# Patient Record
Sex: FEMALE | Race: BLACK OR AFRICAN AMERICAN | URBAN - METROPOLITAN AREA
[De-identification: names, ages, dates, MRNs, and addresses within clinical notes are randomized per-mention and may not be internally consistent; named-entity substitution may affect disease eponyms.]

---

## 2020-06-18 ENCOUNTER — OFFICE VISIT (OUTPATIENT)
Dept: URBAN - METROPOLITAN AREA CLINIC 29 | Facility: CLINIC | Age: 70
End: 2020-06-18

## 2020-06-18 PROBLEM — 3696007 FUNCTIONAL DYSPEPSIA: Status: ACTIVE | Noted: 2020-06-18

## 2020-06-18 PROBLEM — 14760008 CONSTIPATION: Status: ACTIVE | Noted: 2020-06-18

## 2020-07-16 ENCOUNTER — OFFICE VISIT (OUTPATIENT)
Dept: URBAN - METROPOLITAN AREA CLINIC 29 | Facility: CLINIC | Age: 70
End: 2020-07-16

## 2020-08-11 ENCOUNTER — OFFICE VISIT (OUTPATIENT)
Dept: URBAN - METROPOLITAN AREA CLINIC 29 | Facility: CLINIC | Age: 70
End: 2020-08-11

## 2021-01-05 ENCOUNTER — OFFICE VISIT (OUTPATIENT)
Dept: URBAN - METROPOLITAN AREA CLINIC 29 | Facility: CLINIC | Age: 71
End: 2021-01-05

## 2022-04-30 ENCOUNTER — TELEPHONE ENCOUNTER (OUTPATIENT)
Dept: URBAN - METROPOLITAN AREA CLINIC 121 | Facility: CLINIC | Age: 72
End: 2022-04-30

## 2022-04-30 RX ORDER — PANTOPRAZOLE SODIUM 40 MG/1
TAKE 1 TABLET PO BID TABLET, DELAYED RELEASE ORAL
OUTPATIENT
Start: 2020-06-18

## 2022-04-30 RX ORDER — SUCRALFATE 1 G/10ML
TAKE 1 GM PO TID X 2 WEEKS SUSPENSION ORAL
OUTPATIENT
Start: 2020-06-18 | End: 2020-07-02

## 2022-05-01 ENCOUNTER — TELEPHONE ENCOUNTER (OUTPATIENT)
Dept: URBAN - METROPOLITAN AREA CLINIC 121 | Facility: CLINIC | Age: 72
End: 2022-05-01

## 2022-05-01 RX ORDER — PANTOPRAZOLE SODIUM 40 MG/1
TAKE 1 TABLET BY MOUTH TWICE A DAY TABLET, DELAYED RELEASE ORAL
Status: ACTIVE | COMMUNITY
Start: 2021-02-12

## 2022-05-01 RX ORDER — FAMOTIDINE 40 MG/1
TABLET, FILM COATED ORAL
Status: ACTIVE | COMMUNITY
Start: 2020-06-17

## 2022-05-01 RX ORDER — LACTASE 3000 UNIT
TABLET ORAL
Status: ACTIVE | COMMUNITY
Start: 2020-06-17

## 2022-06-14 ENCOUNTER — WEB ENCOUNTER (OUTPATIENT)
Dept: URBAN - METROPOLITAN AREA CLINIC 92 | Facility: CLINIC | Age: 72
End: 2022-06-14

## 2022-06-14 ENCOUNTER — TELEPHONE ENCOUNTER (OUTPATIENT)
Dept: URBAN - METROPOLITAN AREA CLINIC 84 | Facility: CLINIC | Age: 72
End: 2022-06-14

## 2022-06-14 ENCOUNTER — CLAIMS CREATED FROM THE CLAIM WINDOW (OUTPATIENT)
Dept: URBAN - METROPOLITAN AREA CLINIC 92 | Facility: CLINIC | Age: 72
End: 2022-06-14

## 2022-06-14 ENCOUNTER — CLAIMS CREATED FROM THE CLAIM WINDOW (OUTPATIENT)
Dept: URBAN - METROPOLITAN AREA CLINIC 92 | Facility: CLINIC | Age: 72
End: 2022-06-14
Payer: MEDICARE

## 2022-06-14 ENCOUNTER — OFFICE VISIT (OUTPATIENT)
Dept: URBAN - METROPOLITAN AREA CLINIC 92 | Facility: CLINIC | Age: 72
End: 2022-06-14

## 2022-06-14 VITALS
HEART RATE: 61 BPM | BODY MASS INDEX: 33.99 KG/M2 | HEIGHT: 61 IN | WEIGHT: 180 LBS | TEMPERATURE: 98 F | DIASTOLIC BLOOD PRESSURE: 98 MMHG | SYSTOLIC BLOOD PRESSURE: 152 MMHG

## 2022-06-14 DIAGNOSIS — D12.6 TUBULAR ADENOMA OF COLON: ICD-10-CM

## 2022-06-14 DIAGNOSIS — K57.90 DIVERTICULOSIS: ICD-10-CM

## 2022-06-14 DIAGNOSIS — K59.01 CONSTIPATION: ICD-10-CM

## 2022-06-14 DIAGNOSIS — K57.30 ACQUIRED DIVERTICULOSIS OF COLON: ICD-10-CM

## 2022-06-14 DIAGNOSIS — K21.9 GERD: ICD-10-CM

## 2022-06-14 DIAGNOSIS — D12.5 ADENOMA OF SIGMOID COLON: ICD-10-CM

## 2022-06-14 PROCEDURE — 99203 OFFICE O/P NEW LOW 30 MIN: CPT | Performed by: INTERNAL MEDICINE

## 2022-06-14 RX ORDER — PANTOPRAZOLE SODIUM 40 MG/1
TAKE 1 TABLET BY MOUTH TWICE A DAY TABLET, DELAYED RELEASE ORAL
Status: ACTIVE | COMMUNITY
Start: 2021-02-12

## 2022-06-14 RX ORDER — PANTOPRAZOLE SODIUM 40 MG/1
TAKE 1 TABLET BY MOUTH TWICE A DAY TABLET, DELAYED RELEASE ORAL
OUTPATIENT
Start: 2021-02-12

## 2022-06-14 RX ORDER — LACTASE 3000 UNIT
TABLET ORAL
Status: ACTIVE | COMMUNITY
Start: 2020-06-17

## 2022-06-14 RX ORDER — LINACLOTIDE 145 UG/1
1 CAPSULE AT LEAST 30 MINUTES BEFORE THE FIRST MEAL CAPSULE, GELATIN COATED ORAL ONCE A DAY
Qty: 30 | Refills: 2 | OUTPATIENT
Start: 2022-06-14 | End: 2022-09-12

## 2022-06-14 RX ORDER — PANTOPRAZOLE SODIUM 20 MG/1
1 TABLET TABLET, DELAYED RELEASE ORAL ONCE A DAY
Qty: 90 | Refills: 2 | OUTPATIENT
Start: 2022-06-14

## 2022-06-14 NOTE — HPI-TODAY'S VISIT:
This is a 70 yo female referred by Dr Steph Saldivar for constipation.  She has been under ethe care of Dr Devan Mcgee for several years.  She had a CT scan WITHOUT IV CONTRAST for abdominal pain December 2020 which demonstrated gallstones, diverticulosis and a small hiatal hernia.   Constipation is moderately controlled with miralax, stool softener and for severe cases Mg citrate. The patient has been on pantoprazole for 20 mg for at least one year.  Her symptoms are exacerbated with tomato-based products and greasy meals.

## 2022-07-07 ENCOUNTER — TELEPHONE ENCOUNTER (OUTPATIENT)
Dept: URBAN - METROPOLITAN AREA CLINIC 92 | Facility: CLINIC | Age: 72
End: 2022-07-07

## 2022-07-07 RX ORDER — LINACLOTIDE 72 UG/1
1 CAPSULE AT LEAST 30 MINUTES BEFORE THE FIRST MEAL OF THE DAY ON AN EMPTY STOMACH CAPSULE, GELATIN COATED ORAL ONCE A DAY
Qty: 30 | Refills: 3 | OUTPATIENT
Start: 2022-07-08 | End: 2022-11-05

## 2022-07-07 RX ORDER — LINACLOTIDE 145 UG/1
1 CAPSULE AT LEAST 30 MINUTES BEFORE THE FIRST MEAL CAPSULE, GELATIN COATED ORAL ONCE A DAY
OUTPATIENT
Start: 2022-06-14 | End: 2022-09-12

## 2022-08-15 ENCOUNTER — WEB ENCOUNTER (OUTPATIENT)
Dept: URBAN - METROPOLITAN AREA TELEHEALTH 2 | Facility: TELEHEALTH | Age: 72
End: 2022-08-15

## 2022-08-15 ENCOUNTER — OFFICE VISIT (OUTPATIENT)
Dept: URBAN - METROPOLITAN AREA TELEHEALTH 2 | Facility: TELEHEALTH | Age: 72
End: 2022-08-15

## 2022-08-15 ENCOUNTER — TELEPHONE ENCOUNTER (OUTPATIENT)
Dept: URBAN - METROPOLITAN AREA CLINIC 92 | Facility: CLINIC | Age: 72
End: 2022-08-15

## 2022-08-15 VITALS — WEIGHT: 180 LBS | HEIGHT: 61 IN | BODY MASS INDEX: 33.99 KG/M2

## 2022-08-15 DIAGNOSIS — K59.01 CONSTIPATION: ICD-10-CM

## 2022-08-15 DIAGNOSIS — K21.9 GERD: ICD-10-CM

## 2022-08-15 DIAGNOSIS — K57.90 DIVERTICULOSIS: ICD-10-CM

## 2022-08-15 RX ORDER — LACTASE 3000 UNIT
TABLET ORAL
Status: ACTIVE | COMMUNITY
Start: 2020-06-17

## 2022-08-15 RX ORDER — PANTOPRAZOLE SODIUM 20 MG/1
1 TABLET TABLET, DELAYED RELEASE ORAL ONCE A DAY
Qty: 90 | Refills: 2 | Status: ACTIVE | COMMUNITY
Start: 2022-06-14

## 2022-08-15 RX ORDER — LINACLOTIDE 72 UG/1
1 CAPSULE AT LEAST 30 MINUTES BEFORE THE FIRST MEAL OF THE DAY ON AN EMPTY STOMACH CAPSULE, GELATIN COATED ORAL ONCE A DAY
Qty: 30 | Refills: 3 | Status: ACTIVE | COMMUNITY
Start: 2022-07-08 | End: 2022-11-05

## 2022-08-15 NOTE — HPI-TODAY'S VISIT:
The patient is here for follow up of constipation.  Linzess 72 mcg at times causes diarrhea.  Miralax is taken for breakthrough but stool evacuation takes up to 24 hours.  She has never taken it on a regular basis.  Patient is doing well on pantoprazole 20 mg every other day.

## 2022-08-15 NOTE — HPI-OTHER HISTORIES
(June 2022) This is a 26 yo female here for chrnoic constipation.  She is on Linzess 72 mcg once a day was effective but now it seems as if she has developed a tolerance. She has been constipated most of her life but it got worse in 2019.   Linzess 145 sample were given which were effective but prescribed as needed.   She is concerned that she has a blockage or a motility issue.  Patient is uninsured as of 3 days ago and will need patient assistance for medication.

## 2022-10-24 ENCOUNTER — OFFICE VISIT (OUTPATIENT)
Dept: URBAN - METROPOLITAN AREA TELEHEALTH 2 | Facility: TELEHEALTH | Age: 72
End: 2022-10-24

## 2022-10-24 VITALS — WEIGHT: 180 LBS | HEIGHT: 61 IN | BODY MASS INDEX: 33.99 KG/M2

## 2022-10-24 RX ORDER — LINACLOTIDE 72 UG/1
1 CAPSULE AT LEAST 30 MINUTES BEFORE THE FIRST MEAL OF THE DAY ON AN EMPTY STOMACH CAPSULE, GELATIN COATED ORAL ONCE A DAY
Qty: 30 | Refills: 3 | Status: ACTIVE | COMMUNITY
Start: 2022-07-08 | End: 2022-11-05

## 2022-10-24 RX ORDER — LACTASE 3000 UNIT
TABLET ORAL
Status: ACTIVE | COMMUNITY
Start: 2020-06-17

## 2022-10-24 RX ORDER — PANTOPRAZOLE SODIUM 20 MG/1
1 TABLET TABLET, DELAYED RELEASE ORAL ONCE A DAY
Qty: 90 | Refills: 2 | Status: ACTIVE | COMMUNITY
Start: 2022-06-14

## 2022-10-24 NOTE — HPI-OTHER HISTORIES
(June 2022) This is a 71 yo female here for chrnoic constipation.  She is on Linzess 72 mcg once a day was effective but now it seems as if she has developed a tolerance. She has been constipated most of her life but it got worse in 2019.   Linzess 145 sample were given which were effective but prescribed as needed.   She is concerned that she has a blockage or a motility issue.  Patient is uninsured as of 3 days ago and will need patient assistance for medication.  (August 2022) The patient is here for follow up of constipation.  Linzess 72 mcg at times causes diarrhea.  Miralax is taken for breakthrough but stool evacuation takes up to 24 hours.  She has never taken it on a regular basis.  Patient is doing well on pantoprazole 20 mg every other day.

## 2022-10-24 NOTE — HPI-TODAY'S VISIT:
The patient is a 73 yo female here for follow up of constipation.  Linzess 72 mcg at times caused diarrhea.  Miralax once a day was recommended.  Today she reports

## 2022-10-31 ENCOUNTER — OFFICE VISIT (OUTPATIENT)
Dept: URBAN - METROPOLITAN AREA TELEHEALTH 2 | Facility: TELEHEALTH | Age: 72
End: 2022-10-31
Payer: MEDICARE

## 2022-10-31 ENCOUNTER — TELEPHONE ENCOUNTER (OUTPATIENT)
Dept: URBAN - METROPOLITAN AREA CLINIC 92 | Facility: CLINIC | Age: 72
End: 2022-10-31

## 2022-10-31 VITALS — HEIGHT: 61 IN | WEIGHT: 180 LBS | BODY MASS INDEX: 33.99 KG/M2

## 2022-10-31 DIAGNOSIS — K57.90 DIVERTICULOSIS: ICD-10-CM

## 2022-10-31 DIAGNOSIS — K21.9 GERD: ICD-10-CM

## 2022-10-31 DIAGNOSIS — K59.01 CONSTIPATION: ICD-10-CM

## 2022-10-31 PROCEDURE — 99213 OFFICE O/P EST LOW 20 MIN: CPT | Performed by: INTERNAL MEDICINE

## 2022-10-31 RX ORDER — LINACLOTIDE 72 UG/1
1 CAPSULE AT LEAST 30 MINUTES BEFORE THE FIRST MEAL OF THE DAY ON AN EMPTY STOMACH CAPSULE, GELATIN COATED ORAL ONCE A DAY
Qty: 90 | Refills: 3

## 2022-10-31 RX ORDER — FAMOTIDINE 40 MG/1
1 TABLET AT BEDTIME TABLET, FILM COATED ORAL ONCE A DAY
Qty: 90 | Refills: 3 | OUTPATIENT
Start: 2022-10-31

## 2022-10-31 RX ORDER — LACTASE 3000 UNIT
TABLET ORAL
Status: ACTIVE | COMMUNITY
Start: 2020-06-17

## 2022-10-31 RX ORDER — PANTOPRAZOLE SODIUM 20 MG/1
1 TABLET TABLET, DELAYED RELEASE ORAL ONCE A DAY
Qty: 90 | Refills: 2 | Status: ACTIVE | COMMUNITY
Start: 2022-06-14

## 2022-10-31 RX ORDER — LINACLOTIDE 72 UG/1
1 CAPSULE AT LEAST 30 MINUTES BEFORE THE FIRST MEAL OF THE DAY ON AN EMPTY STOMACH CAPSULE, GELATIN COATED ORAL ONCE A DAY
Qty: 30 | Refills: 3 | Status: ACTIVE | COMMUNITY
Start: 2022-07-08 | End: 2022-11-05

## 2022-10-31 RX ORDER — PANTOPRAZOLE SODIUM 20 MG/1
1 TABLET TABLET, DELAYED RELEASE ORAL ONCE A DAY
OUTPATIENT
Start: 2022-06-14

## 2022-10-31 NOTE — HPI-TODAY'S VISIT:
The patient is a 73 yo female here for follow up of constipation.  Linzess 72 mcg at times caused diarrhea.  Miralax once a day was recommended.  Today she reports Linzess still causes diarrrhea.  Miralax is not effective after taking it for 3 consecutive days.  Stool softener is effective after taking it for 2 consecutive days. Pantoprazole tapered to every other day controls her symptoms.

## 2023-01-19 ENCOUNTER — WEB ENCOUNTER (OUTPATIENT)
Dept: URBAN - METROPOLITAN AREA CLINIC 92 | Facility: CLINIC | Age: 73
End: 2023-01-19

## 2023-01-19 ENCOUNTER — OFFICE VISIT (OUTPATIENT)
Dept: URBAN - METROPOLITAN AREA CLINIC 92 | Facility: CLINIC | Age: 73
End: 2023-01-19
Payer: MEDICARE

## 2023-01-19 VITALS
DIASTOLIC BLOOD PRESSURE: 92 MMHG | TEMPERATURE: 97.1 F | BODY MASS INDEX: 32.28 KG/M2 | SYSTOLIC BLOOD PRESSURE: 133 MMHG | WEIGHT: 171 LBS | HEART RATE: 60 BPM | HEIGHT: 61 IN

## 2023-01-19 DIAGNOSIS — R63.4 WEIGHT LOSS: ICD-10-CM

## 2023-01-19 DIAGNOSIS — K59.01 CONSTIPATION: ICD-10-CM

## 2023-01-19 DIAGNOSIS — K57.90 DIVERTICULOSIS: ICD-10-CM

## 2023-01-19 DIAGNOSIS — K21.9 GERD: ICD-10-CM

## 2023-01-19 PROBLEM — 398050005 DIVERTICULAR DISEASE OF COLON: Status: ACTIVE | Noted: 2022-06-14

## 2023-01-19 PROBLEM — 235595009 GASTROESOPHAGEAL REFLUX DISEASE: Status: ACTIVE | Noted: 2022-06-14

## 2023-01-19 PROBLEM — 14760008 CONSTIPATION: Status: ACTIVE | Noted: 2022-06-14

## 2023-01-19 PROCEDURE — 99214 OFFICE O/P EST MOD 30 MIN: CPT | Performed by: INTERNAL MEDICINE

## 2023-01-19 RX ORDER — LINACLOTIDE 72 UG/1
1 CAPSULE AT LEAST 30 MINUTES BEFORE THE FIRST MEAL OF THE DAY ON AN EMPTY STOMACH CAPSULE, GELATIN COATED ORAL ONCE A DAY
Qty: 90 | Refills: 3 | Status: ACTIVE | COMMUNITY

## 2023-01-19 RX ORDER — LACTASE 3000 UNIT
TABLET ORAL
Status: ACTIVE | COMMUNITY
Start: 2020-06-17

## 2023-01-19 RX ORDER — FAMOTIDINE 40 MG/1
1 TABLET AT BEDTIME TABLET, FILM COATED ORAL ONCE A DAY
Qty: 90 | Refills: 3 | OUTPATIENT

## 2023-01-19 RX ORDER — FAMOTIDINE 40 MG/1
1 TABLET AT BEDTIME TABLET, FILM COATED ORAL ONCE A DAY
Qty: 90 | Refills: 3 | Status: ACTIVE | COMMUNITY
Start: 2022-10-31

## 2023-01-19 NOTE — HPI-OTHER HISTORIES
(June 2022) This is a 73 yo female here for chrnoic constipation.  She is on Linzess 72 mcg once a day was effective but now it seems as if she has developed a tolerance. She has been constipated most of her life but it got worse in 2019.   Linzess 145 sample were given which were effective but prescribed as needed.   She is concerned that she has a blockage or a motility issue.  Patient is uninsured as of 3 days ago and will need patient assistance for medication.  (August 2022) The patient is here for follow up of constipation.  Linzess 72 mcg at times causes diarrhea.  Miralax is taken for breakthrough but stool evacuation takes up to 24 hours.  She has never taken it on a regular basis.  Patient is doing well on pantoprazole 20 mg every other day.  (October 2022) The patient is a 73 yo female here for follow up of constipation.  Linzess 72 mcg at times caused diarrhea.  Miralax once a day was recommended.  Today she reports Linzess still causes diarrrhea.  Miralax is not effective after taking it for 3 consecutive days.  Stool softener is effective after taking it for 2 consecutive days. Pantoprazole tapered to every other day controls her symptoms.

## 2023-01-19 NOTE — PHYSICAL EXAM RECTAL:
normal tone, no external hemorrhoids, no masses palpable, no red blood, Tenderness on BELINDA, Internal hemorrhoids present

## 2023-01-19 NOTE — HPI-TODAY'S VISIT:
The patient is a 71 yo female here for follow up of constipation.  Linzess 72 mcg at times caused diarrhea.  Miralax once a day was recommended.  Today she reports Linzess still causes diarrrhea.  Miralax is not effective after taking it for 3 consecutive days.  Stool softener is effective after taking it for 2 consecutive days. Famotidine 40 mg once a day is taken each night. Obtain labs from Dr Steph Velez her PCP done one year ago.

## 2023-01-20 ENCOUNTER — TELEPHONE ENCOUNTER (OUTPATIENT)
Dept: URBAN - METROPOLITAN AREA CLINIC 92 | Facility: CLINIC | Age: 73
End: 2023-01-20

## 2023-01-20 RX ORDER — SUCRALFATE 1 G/10ML
10 ML ON AN EMPTY STOMACH SUSPENSION ORAL TWICE A DAY
Qty: 600 ML | Refills: 3

## 2023-01-22 ENCOUNTER — WEB ENCOUNTER (OUTPATIENT)
Dept: URBAN - METROPOLITAN AREA CLINIC 92 | Facility: CLINIC | Age: 73
End: 2023-01-22

## 2023-01-24 ENCOUNTER — TELEPHONE ENCOUNTER (OUTPATIENT)
Dept: URBAN - METROPOLITAN AREA CLINIC 92 | Facility: CLINIC | Age: 73
End: 2023-01-24

## 2023-01-27 ENCOUNTER — TELEPHONE ENCOUNTER (OUTPATIENT)
Dept: URBAN - METROPOLITAN AREA CLINIC 92 | Facility: CLINIC | Age: 73
End: 2023-01-27

## 2023-02-16 ENCOUNTER — TELEPHONE ENCOUNTER (OUTPATIENT)
Dept: URBAN - METROPOLITAN AREA CLINIC 92 | Facility: CLINIC | Age: 73
End: 2023-02-16

## 2023-05-11 ENCOUNTER — DASHBOARD ENCOUNTERS (OUTPATIENT)
Age: 73
End: 2023-05-11

## 2023-05-11 ENCOUNTER — OFFICE VISIT (OUTPATIENT)
Dept: URBAN - METROPOLITAN AREA CLINIC 92 | Facility: CLINIC | Age: 73
End: 2023-05-11
Payer: MEDICARE

## 2023-05-11 VITALS
BODY MASS INDEX: 32.32 KG/M2 | WEIGHT: 171.2 LBS | TEMPERATURE: 97.3 F | SYSTOLIC BLOOD PRESSURE: 132 MMHG | HEART RATE: 55 BPM | HEIGHT: 61 IN | DIASTOLIC BLOOD PRESSURE: 92 MMHG

## 2023-05-11 DIAGNOSIS — K57.90 DIVERTICULOSIS: ICD-10-CM

## 2023-05-11 DIAGNOSIS — Z86.010 PERSONAL HISTORY OF COLONIC POLYPS: ICD-10-CM

## 2023-05-11 DIAGNOSIS — K59.01 CONSTIPATION: ICD-10-CM

## 2023-05-11 DIAGNOSIS — K21.9 GERD: ICD-10-CM

## 2023-05-11 DIAGNOSIS — R63.4 WEIGHT LOSS: ICD-10-CM

## 2023-05-11 PROBLEM — 428283002: Status: ACTIVE | Noted: 2023-05-11

## 2023-05-11 PROCEDURE — 99213 OFFICE O/P EST LOW 20 MIN: CPT | Performed by: INTERNAL MEDICINE

## 2023-05-11 RX ORDER — FAMOTIDINE 40 MG/1
1 TABLET AT BEDTIME TABLET, FILM COATED ORAL ONCE A DAY
Qty: 90 | Refills: 3 | OUTPATIENT

## 2023-05-11 RX ORDER — LINACLOTIDE 72 UG/1
1 CAPSULE AT LEAST 30 MINUTES BEFORE THE FIRST MEAL OF THE DAY ON AN EMPTY STOMACH CAPSULE, GELATIN COATED ORAL ONCE A DAY
Qty: 90 | Refills: 3 | Status: ACTIVE | COMMUNITY

## 2023-05-11 RX ORDER — LACTASE 3000 UNIT
TABLET ORAL
Status: ACTIVE | COMMUNITY
Start: 2020-06-17

## 2023-05-11 RX ORDER — FAMOTIDINE 40 MG/1
1 TABLET AT BEDTIME TABLET, FILM COATED ORAL ONCE A DAY
Qty: 90 | Refills: 3 | Status: ACTIVE | COMMUNITY

## 2023-05-11 RX ORDER — SUCRALFATE 1 G/10ML
10 ML ON AN EMPTY STOMACH SUSPENSION ORAL TWICE A DAY
Qty: 600 ML | Refills: 3 | Status: ACTIVE | COMMUNITY

## 2023-05-11 NOTE — HPI-TODAY'S VISIT:
This s a 73 yo female here for follow up of an MRI obtained fro weight loss.  It was unremarkable.  Linzess 72 mcg helps with constipation however stools are still hard.  She only takes it as needed.  She takes 2 stool softeners at night.
27-Sep-2018

## 2023-05-11 NOTE — PHYSICAL EXAM NEUROLOGIC:
oriented to person, place and time Skin normal color for race, warm, dry and intact. No evidence of rash.

## 2023-05-11 NOTE — HPI-OTHER HISTORIES
(June 2022) This is a 71 yo female here for chrnoic constipation.  She is on Linzess 72 mcg once a day was effective but now it seems as if she has developed a tolerance. She has been constipated most of her life but it got worse in 2019.   Linzess 145 sample were given which were effective but prescribed as needed.   She is concerned that she has a blockage or a motility issue.  Patient is uninsured as of 3 days ago and will need patient assistance for medication.  (August 2022) The patient is here for follow up of constipation.  Linzess 72 mcg at times causes diarrhea.  Miralax is taken for breakthrough but stool evacuation takes up to 24 hours.  She has never taken it on a regular basis.  Patient is doing well on pantoprazole 20 mg every other day.  (October 2022) The patient is a 71 yo female here for follow up of constipation.  Linzess 72 mcg at times caused diarrhea.  Miralax once a day was recommended.  Today she reports Linzess still causes diarrrhea.  Miralax is not effective after taking it for 3 consecutive days.  Stool softener is effective after taking it for 2 consecutive days. Pantoprazole tapered to every other day controls her symptoms.  (Jan 2023) The patient is a 71 yo female here for follow up of constipation.  Linzess 72 mcg at times caused diarrhea.  Miralax once a day was recommended.  Today she reports Linzess still causes diarrrhea.  Miralax is not effective after taking it for 3 consecutive days.  Stool softener is effective after taking it for 2 consecutive days. Famotidine 40 mg once a day is taken each night. Obtain labs from Dr Steph Velez her PCP done one year ago.

## 2023-08-15 ENCOUNTER — OFFICE VISIT (OUTPATIENT)
Dept: URBAN - METROPOLITAN AREA CLINIC 17 | Facility: CLINIC | Age: 73
End: 2023-08-15